# Patient Record
Sex: FEMALE | Race: WHITE | HISPANIC OR LATINO | URBAN - METROPOLITAN AREA
[De-identification: names, ages, dates, MRNs, and addresses within clinical notes are randomized per-mention and may not be internally consistent; named-entity substitution may affect disease eponyms.]

---

## 2019-06-05 ENCOUNTER — OUTPATIENT (OUTPATIENT)
Dept: OUTPATIENT SERVICES | Facility: HOSPITAL | Age: 44
LOS: 1 days | End: 2019-06-05
Payer: COMMERCIAL

## 2019-06-05 DIAGNOSIS — Z00.00 ENCOUNTER FOR GENERAL ADULT MEDICAL EXAMINATION WITHOUT ABNORMAL FINDINGS: ICD-10-CM

## 2019-06-05 PROCEDURE — 71046 X-RAY EXAM CHEST 2 VIEWS: CPT

## 2019-06-05 PROCEDURE — 71046 X-RAY EXAM CHEST 2 VIEWS: CPT | Mod: 26

## 2019-07-29 ENCOUNTER — HOSPITAL ENCOUNTER (EMERGENCY)
Dept: HOSPITAL 91 - E/R | Age: 44
LOS: 1 days | Discharge: HOME | End: 2019-07-30
Payer: MEDICAID

## 2019-07-29 ENCOUNTER — HOSPITAL ENCOUNTER (EMERGENCY)
Dept: HOSPITAL 10 - E/R | Age: 44
LOS: 1 days | Discharge: HOME | End: 2019-07-30
Payer: MEDICAID

## 2019-07-29 VITALS
HEIGHT: 62 IN | WEIGHT: 179.68 LBS | BODY MASS INDEX: 33.06 KG/M2 | BODY MASS INDEX: 33.06 KG/M2 | WEIGHT: 179.68 LBS | HEIGHT: 62 IN

## 2019-07-29 DIAGNOSIS — K80.20: Primary | ICD-10-CM

## 2019-07-29 LAB
ADD MAN DIFF?: NO
ADD UMIC: YES
ALANINE AMINOTRANSFERASE: 21 IU/L (ref 13–69)
ALBUMIN/GLOBULIN RATIO: 1.2
ALBUMIN: 4.1 G/DL (ref 3.3–4.9)
ALKALINE PHOSPHATASE: 68 IU/L (ref 42–121)
ANION GAP: 9 (ref 5–13)
ASPARTATE AMINO TRANSFERASE: 22 IU/L (ref 15–46)
BASOPHIL #: 0 10^3/UL (ref 0–0.1)
BASOPHILS %: 0.6 % (ref 0–2)
BILIRUBIN,DIRECT: 0 MG/DL (ref 0–0.2)
BILIRUBIN,TOTAL: 0.3 MG/DL (ref 0.2–1.3)
BLOOD UREA NITROGEN: 11 MG/DL (ref 7–20)
CALCIUM: 8.9 MG/DL (ref 8.4–10.2)
CARBON DIOXIDE: 26 MMOL/L (ref 21–31)
CHLORIDE: 108 MMOL/L (ref 97–110)
CREATININE: 0.72 MG/DL (ref 0.44–1)
EOSINOPHILS #: 0.6 10^3/UL (ref 0–0.5)
EOSINOPHILS %: 9.6 % (ref 0–7)
GLOBULIN: 3.4 G/DL (ref 1.3–3.2)
GLUCOSE: 103 MG/DL (ref 70–220)
HEMATOCRIT: 29.8 % (ref 37–47)
HEMOGLOBIN: 8.8 G/DL (ref 12–16)
IMMATURE GRANS #M: 0.02 10^3/UL (ref 0–0.03)
IMMATURE GRANS % (M): 0.3 % (ref 0–0.43)
LIPASE: 151 U/L (ref 23–300)
LYMPHOCYTES #: 2.3 10^3/UL (ref 0.8–2.9)
LYMPHOCYTES %: 34.9 % (ref 15–51)
MEAN CORPUSCULAR HEMOGLOBIN: 23 PG (ref 29–33)
MEAN CORPUSCULAR HGB CONC: 29.5 G/DL (ref 32–37)
MEAN CORPUSCULAR VOLUME: 77.8 FL (ref 82–101)
MEAN PLATELET VOLUME: 10.4 FL (ref 7.4–10.4)
MONOCYTE #: 0.7 10^3/UL (ref 0.3–0.9)
MONOCYTES %: 10.8 % (ref 0–11)
NEUTROPHIL #: 2.8 10^3/UL (ref 1.6–7.5)
NEUTROPHILS %: 43.8 % (ref 39–77)
NUCLEATED RED BLOOD CELLS #: 0 10^3/UL (ref 0–0)
NUCLEATED RED BLOOD CELLS%: 0 /100WBC (ref 0–0)
PLATELET COUNT: 221 10^3/UL (ref 140–415)
POTASSIUM: 3.6 MMOL/L (ref 3.5–5.1)
RED BLOOD COUNT: 3.83 10^6/UL (ref 4.2–5.4)
RED CELL DISTRIBUTION WIDTH: 15.7 % (ref 11.5–14.5)
SODIUM: 143 MMOL/L (ref 135–144)
TOTAL PROTEIN: 7.5 G/DL (ref 6.1–8.1)
UR ASCORBIC ACID: NEGATIVE MG/DL
UR BILIRUBIN (DIP): NEGATIVE MG/DL
UR BLOOD (DIP): (no result) MG/DL
UR CLARITY: CLEAR
UR COLOR: YELLOW
UR GLUCOSE (DIP): NEGATIVE MG/DL
UR KETONES (DIP): NEGATIVE MG/DL
UR LEUKOCYTE ESTERASE (DIP): NEGATIVE LEU/UL
UR NITRITE (DIP): NEGATIVE MG/DL
UR PH (DIP): 5 (ref 5–9)
UR RBC: 2 /HPF (ref 0–5)
UR SPECIFIC GRAVITY (DIP): 1.03 (ref 1–1.03)
UR SQUAMOUS EPITHELIAL CELL: (no result) /HPF
UR TOTAL PROTEIN (DIP): NEGATIVE MG/DL
UR UROBILINOGEN (DIP): NEGATIVE MG/DL
UR WBC: 0 /HPF (ref 0–5)
WHITE BLOOD COUNT: 6.5 10^3/UL (ref 4.8–10.8)

## 2019-07-29 PROCEDURE — 80053 COMPREHEN METABOLIC PANEL: CPT

## 2019-07-29 PROCEDURE — 96374 THER/PROPH/DIAG INJ IV PUSH: CPT

## 2019-07-29 PROCEDURE — 96375 TX/PRO/DX INJ NEW DRUG ADDON: CPT

## 2019-07-29 PROCEDURE — 81001 URINALYSIS AUTO W/SCOPE: CPT

## 2019-07-29 PROCEDURE — 93005 ELECTROCARDIOGRAM TRACING: CPT

## 2019-07-29 PROCEDURE — 99285 EMERGENCY DEPT VISIT HI MDM: CPT

## 2019-07-29 PROCEDURE — 36415 COLL VENOUS BLD VENIPUNCTURE: CPT

## 2019-07-29 PROCEDURE — 83690 ASSAY OF LIPASE: CPT

## 2019-07-29 PROCEDURE — 76705 ECHO EXAM OF ABDOMEN: CPT

## 2019-07-29 PROCEDURE — 81025 URINE PREGNANCY TEST: CPT

## 2019-07-29 PROCEDURE — 85025 COMPLETE CBC W/AUTO DIFF WBC: CPT

## 2019-07-29 RX ADMIN — LIDOCAINE HYDROCHLORIDE 1 MLS/HR: 10 INJECTION, SOLUTION EPIDURAL; INFILTRATION; INTRACAUDAL; PERINEURAL at 23:28

## 2019-07-29 RX ADMIN — ONDANSETRON HYDROCHLORIDE 1 MG: 2 INJECTION, SOLUTION INTRAMUSCULAR; INTRAVENOUS at 23:28

## 2019-07-30 VITALS — HEART RATE: 71 BPM | DIASTOLIC BLOOD PRESSURE: 71 MMHG | RESPIRATION RATE: 17 BRPM | SYSTOLIC BLOOD PRESSURE: 117 MMHG

## 2019-07-30 RX ADMIN — HYDROMORPHONE HYDROCHLORIDE 1 MG: 1 INJECTION, SOLUTION INTRAMUSCULAR; INTRAVENOUS; SUBCUTANEOUS at 00:06

## 2019-07-30 NOTE — ERD
ER Documentation


Chief Complaint


Chief Complaint





EPIGASTRIC PAIN X 8PM TODAY.





HPI


This is a very pleasant 43 infant with epigastric pain since 8 PM today.  Pain 


is mild to moderate intensity with no exacerbating relieving factors.  There is 


mild nausea but no vomiting.  Denies fevers or chills.  Pain is mild to moderate


intensity again with radiations to the back.





ROS


All systems reviewed and are negative except as per history of present illness.





Medications


Home Meds


Reported Medications


Albuterol Sulfate* (Albuterol Sulfate* Neb) 20 Ml Nebu, 20 ML IH


   3/22/12





Allergies


Allergies:  


Coded Allergies:  


     No Known Allergy (Unverified  Allergy, Unknown, 2/13/07)





PMhx/Soc


History of Surgery:  No


Anesthesia Reaction:  No


Hx Neurological Disorder:  No


Hx Respiratory Disorders:  No


Hx Cardiac Disorders:  No


Hx Psychiatric Problems:  No


Hx Miscellaneous Medical Probl:  No


Hx Alcohol Use:  No


Hx Substance Use:  No


Hx Tobacco Use:  No


Smoking Status:  Never smoker





Physical Exam


Vitals





Vital Signs


  Date      Temp  Pulse  Resp  B/P (MAP)   Pulse Ox  O2          O2 Flow    FiO2


Time                                                 Delivery    Rate


   7/29/19           77    12      127/75       100  Room Air


     23:27                           (92)


   7/29/19  97.7     93    20      149/77        99


     22:11                          (101)





Physical Exam


Const:   No acute distress


Head:   Atraumatic 


Eyes:    Normal Conjunctiva


ENT:    Normal External Ears, Nose and Mouth.


Neck:               Full range of motion. No meningismus.


Resp:   Clear to auscultation bilaterally


Cardio:   Regular rate and rhythm, no murmurs


Abd:    Soft, non tender, non distended. Normal bowel sounds


Skin:   No petechiae or rashes


Back:   No midline or flank tenderness


Ext:    No cyanosis, or edema


Neur:   Awake and alert


Psych:    Normal Mood and Affect


Result Diagram:  


7/29/19 2320 7/29/19 2320





Results 24 hrs





Laboratory Tests


      Test
                                  7/29/19
23:20  7/29/19
23:33


      White Blood Count                       6.5 10^3/ul


      Red Blood Count                        3.83 10^6/ul


      Hemoglobin                                 8.8 g/dl


      Hematocrit                                   29.8 %


      Mean Corpuscular Volume                     77.8 fl


      Mean Corpuscular Hemoglobin                 23.0 pg


      Mean Corpuscular Hemoglobin
Concent      29.5 g/dl 
  



      Red Cell Distribution Width                  15.7 %


      Platelet Count                          221 10^3/UL


      Mean Platelet Volume                        10.4 fl


      Immature Granulocytes %                     0.300 %


      Neutrophils %                                43.8 %


      Lymphocytes %                                34.9 %


      Monocytes %                                  10.8 %


      Eosinophils %                                 9.6 %


      Basophils %                                   0.6 %


      Nucleated Red Blood Cells %             0.0 /100WBC


      Immature Granulocytes #               0.020 10^3/ul


      Neutrophils #                           2.8 10^3/ul


      Lymphocytes #                           2.3 10^3/ul


      Monocytes #                             0.7 10^3/ul


      Eosinophils #                           0.6 10^3/ul


      Basophils #                             0.0 10^3/ul


      Nucleated Red Blood Cells #             0.0 10^3/ul


      Urine Color                          YELLOW


      Urine Clarity                        CLEAR


      Urine pH                                        5.0


      Urine Specific Gravity                        1.029


      Urine Ketones                        NEGATIVE mg/dL


      Urine Nitrite                        NEGATIVE mg/dL


      Urine Bilirubin                      NEGATIVE mg/dL


      Urine Urobilinogen                   NEGATIVE mg/dL


      Urine Leukocyte Esterase
            NEGATIVE
Judson/ul  



      Urine Microscopic RBC                        2 /HPF


      Urine Microscopic WBC                        0 /HPF


      Urine Squamous Epithelial
Cells      FEW /HPF 
       



      Urine Hemoglobin                           2+ mg/dL


      Urine Glucose                        NEGATIVE mg/dL


      Urine Total Protein                  NEGATIVE mg/dl


      Sodium Level                             143 mmol/L


      Potassium Level                          3.6 mmol/L


      Chloride Level                           108 mmol/L


      Carbon Dioxide Level                      26 mmol/L


      Anion Gap                                         9


      Blood Urea Nitrogen                        11 mg/dl


      Creatinine                               0.72 mg/dl


      Est Glomerular Filtrat Rate
mL/min   > 60 mL/min 
    



      Glucose Level                             103 mg/dl


      Calcium Level                             8.9 mg/dl


      Total Bilirubin                           0.3 mg/dl


      Direct Bilirubin                         0.00 mg/dl


      Indirect Bilirubin                        0.3 mg/dl


      Aspartate Amino Transf
(AST/SGOT)          22 IU/L 
  



      Alanine Aminotransferase
(ALT/SGPT)        21 IU/L 
  



      Alkaline Phosphatase                        68 IU/L


      Total Protein                              7.5 g/dl


      Albumin                                    4.1 g/dl


      Globulin                                  3.40 g/dl


      Albumin/Globulin Ratio                         1.20


      Lipase                                      151 U/L


      POC Beta HCG, Qualitative                             NEGATIVE





Current Medications


 Medications
   Dose
          Sig/Spike
       Start Time
   Status  Last


 (Trade)       Ordered        Route
 PRN     Stop Time              Admin
Dose


                              Reason                                Admin


 Sodium         500 ml @ 
     Q1H STAT
      7/29/19       DC           7/29/19


Chloride       500 mls/hr     IV
            23:12
                       23:28



                                             7/30/19 00:11


 Morphine       4 mg           ONCE  STAT
    7/29/19       DC           7/29/19


Sulfate
                      IV
            23:12
                       23:28



(morphine)                                   7/29/19 23:15


 Ondansetron    4 mg           ONCE  STAT
    7/29/19       DC           7/29/19


HCl
  (Zofran                 IV
            23:12
                       23:28



Inj)                                         7/29/19 23:15


                0.5 mg         ONCE  STAT
    7/29/19       DC           7/30/19


Hydromorphone                 IV
            23:57
                       00:06



HCl
                                         7/30/19 00:02


(Dilaudid)








Procedures/MDM


Medical decision making: Patient does have evidence of stone in gallbladder neck


but no evidence of obstruction.  Pain is somewhat resolved in the emergency 


department she is stable for trial of outpatient management.  Patient's 


gastrointestinal symptoms have stabilized while in the department.


No evidence of severe dehydration, sepsis, or surgical abdomen.


Extensive discussion with family and patient that occult disease cannot be ruled


out.


   8 hour recheck for repeat abdominal exam is planned.





Departure


Diagnosis:  


   Primary Impression:  


   Gallstones


Condition:  Stable











JUDY ARZATE             Jul 30, 2019 01:55

## 2019-10-30 ENCOUNTER — HOSPITAL ENCOUNTER (EMERGENCY)
Dept: HOSPITAL 10 - E/R | Age: 44
Discharge: HOME | End: 2019-10-30
Payer: MEDICAID

## 2019-10-30 VITALS
HEIGHT: 62 IN | HEIGHT: 62 IN | WEIGHT: 178.35 LBS | WEIGHT: 178.35 LBS | BODY MASS INDEX: 32.82 KG/M2 | BODY MASS INDEX: 32.82 KG/M2

## 2019-10-30 VITALS — HEART RATE: 90 BPM | DIASTOLIC BLOOD PRESSURE: 67 MMHG | RESPIRATION RATE: 18 BRPM | SYSTOLIC BLOOD PRESSURE: 112 MMHG

## 2019-10-30 DIAGNOSIS — K80.20: Primary | ICD-10-CM

## 2019-10-30 PROCEDURE — 36415 COLL VENOUS BLD VENIPUNCTURE: CPT

## 2019-10-30 PROCEDURE — 96361 HYDRATE IV INFUSION ADD-ON: CPT

## 2019-10-30 PROCEDURE — 80053 COMPREHEN METABOLIC PANEL: CPT

## 2019-10-30 PROCEDURE — 83690 ASSAY OF LIPASE: CPT

## 2019-10-30 PROCEDURE — 96374 THER/PROPH/DIAG INJ IV PUSH: CPT

## 2019-10-30 PROCEDURE — 96375 TX/PRO/DX INJ NEW DRUG ADDON: CPT

## 2019-10-30 PROCEDURE — 85025 COMPLETE CBC W/AUTO DIFF WBC: CPT

## 2020-04-26 ENCOUNTER — MESSAGE (OUTPATIENT)
Age: 45
End: 2020-04-26

## 2022-09-14 PROBLEM — Z00.00 ENCOUNTER FOR PREVENTIVE HEALTH EXAMINATION: Status: ACTIVE | Noted: 2022-09-14
